# Patient Record
Sex: MALE | Race: WHITE
[De-identification: names, ages, dates, MRNs, and addresses within clinical notes are randomized per-mention and may not be internally consistent; named-entity substitution may affect disease eponyms.]

---

## 2018-06-26 ENCOUNTER — HOSPITAL ENCOUNTER (EMERGENCY)
Dept: HOSPITAL 80 - FED | Age: 23
Discharge: HOME | End: 2018-06-26
Payer: COMMERCIAL

## 2018-06-26 VITALS — DIASTOLIC BLOOD PRESSURE: 86 MMHG | SYSTOLIC BLOOD PRESSURE: 138 MMHG
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
## 2018-06-26 DIAGNOSIS — S52.571A: Primary | ICD-10-CM

## 2018-06-26 DIAGNOSIS — S20.211A: ICD-10-CM
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
## 2018-06-26 DIAGNOSIS — S50.311A: ICD-10-CM

## 2018-06-26 DIAGNOSIS — S70.211A: ICD-10-CM

## 2018-06-26 DIAGNOSIS — S52.021A: ICD-10-CM
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
## 2018-06-27 ENCOUNTER — HOSPITAL ENCOUNTER (OUTPATIENT)
Dept: HOSPITAL 80 - FSGY | Age: 23
Discharge: HOME | End: 2018-06-27
Attending: ORTHOPAEDIC SURGERY
Payer: COMMERCIAL

## 2018-06-27 VITALS — SYSTOLIC BLOOD PRESSURE: 119 MMHG | DIASTOLIC BLOOD PRESSURE: 77 MMHG

## 2018-06-27 DIAGNOSIS — S52.571A: ICD-10-CM

## 2018-06-27 DIAGNOSIS — S42.021A: Primary | ICD-10-CM
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
## 2018-06-27 PROCEDURE — 0PH904Z INSERTION OF INTERNAL FIXATION DEVICE INTO RIGHT CLAVICLE, OPEN APPROACH: ICD-10-PCS | Performed by: ORTHOPAEDIC SURGERY

## 2018-06-27 PROCEDURE — C1713 ANCHOR/SCREW BN/BN,TIS/BN: HCPCS

## 2018-06-27 PROCEDURE — 0PS90ZZ REPOSITION RIGHT CLAVICLE, OPEN APPROACH: ICD-10-PCS | Performed by: ORTHOPAEDIC SURGERY
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
